# Patient Record
Sex: FEMALE | Race: BLACK OR AFRICAN AMERICAN | NOT HISPANIC OR LATINO
[De-identification: names, ages, dates, MRNs, and addresses within clinical notes are randomized per-mention and may not be internally consistent; named-entity substitution may affect disease eponyms.]

---

## 2020-03-28 ENCOUNTER — LABORATORY RESULT (OUTPATIENT)
Age: 50
End: 2020-03-28

## 2020-03-29 ENCOUNTER — APPOINTMENT (OUTPATIENT)
Dept: DISASTER EMERGENCY | Facility: CLINIC | Age: 50
End: 2020-03-29
Payer: COMMERCIAL

## 2020-03-29 VITALS
OXYGEN SATURATION: 98 % | TEMPERATURE: 98.6 F | DIASTOLIC BLOOD PRESSURE: 84 MMHG | HEART RATE: 74 BPM | SYSTOLIC BLOOD PRESSURE: 156 MMHG | RESPIRATION RATE: 14 BRPM

## 2020-03-29 DIAGNOSIS — Z20.828 CONTACT WITH AND (SUSPECTED) EXPOSURE TO OTHER VIRAL COMMUNICABLE DISEASES: ICD-10-CM

## 2020-03-29 DIAGNOSIS — R05 COUGH: ICD-10-CM

## 2020-03-29 DIAGNOSIS — R06.02 SHORTNESS OF BREATH: ICD-10-CM

## 2020-03-29 PROBLEM — Z00.00 ENCOUNTER FOR PREVENTIVE HEALTH EXAMINATION: Status: ACTIVE | Noted: 2020-03-29

## 2020-03-29 PROCEDURE — 99202 OFFICE O/P NEW SF 15 MIN: CPT

## 2020-06-10 PROBLEM — Z20.828 CLOSE EXPOSURE TO COVID-19 VIRUS: Status: ACTIVE | Noted: 2020-06-10

## 2020-06-10 PROBLEM — R05 COUGH: Status: ACTIVE | Noted: 2020-06-10

## 2020-06-10 PROBLEM — R06.02 SHORTNESS OF BREATH: Status: ACTIVE | Noted: 2020-06-10

## 2020-06-10 NOTE — HISTORY OF PRESENT ILLNESS
[FreeTextEntry8] : MISTI JEFFERY  is a 50 year old  F\par \par There are atypical symptoms of COVID: cough, SOB.  \par There are clinical high risk features: hypertension.  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  \par Limited physical exam was performed with exceptions as noted below.\par

## 2020-06-10 NOTE — PLAN
[FreeTextEntry1] : COVID 19 testing was performed.\par \par Advised test results may take days to return. \par Discussed diagnostic accuracy and possibility of false negative results\par Instructed to self quarantine.   \par \par Quarantine steps: Do not go to work, school or public areas. Avoid using public transportation, airports, taxis and ride sharing. As much as possible separate themselves from other people at home. Wear mask when ever they are around other people. Reschedule non-urgent medical appointments to another date. Wash hands with soap and water for at least 20 seconds or use an alcohol based  that contains 60-95% alcohol covering all surfaces until dry. Cover mouth and nose with tissue when they cough or sneeze, throw tissue in trash and wash hands. Avoid touching eyes, mouth and nose with hands. Avoid sharing personal items such as dishes, drinking glasses, cups, eating utensils, towels and bedding with other people. Clean and disinfect all high touch surfaces.\par All patients close contacts should also self quarantine.  \par As per CDC guidelines, may discontinue quarantine when the following 3 conditions occur. 1) Pt has no fever with out taking anti fever medications for 3 days.  2) Other symptoms have improved.  3) At least 7 days have passed since your first symptoms appeared.  \par Social distancing once quarantine is completed.\par \par Close contacts with comorbidities are at higher risk of COVID disease.  \par \par If high risk symptoms of chest discomfort, severe shortness of breath at rest, worsening shortness of breath with minimal exertion, new or worsening wheezing, dizziness, bluish tint to lips/face, confusion or inability to arouse,  then instructed to seek emergent evaluation.\par \par The above plan was reviewed with the patient.\par All questions have been answered.\par Instructed to call PMD or  [address] [place] [address] with further questions.\par \par The entirety of this note is confirmed by the signing provider below.\par \par Sincerely,\par \par \par Soy Lorenzo PA-C\par

## 2021-11-18 ENCOUNTER — NON-APPOINTMENT (OUTPATIENT)
Age: 51
End: 2021-11-18

## 2021-11-18 ENCOUNTER — APPOINTMENT (OUTPATIENT)
Dept: ORTHOPEDIC SURGERY | Facility: CLINIC | Age: 51
End: 2021-11-18
Payer: COMMERCIAL

## 2021-11-18 DIAGNOSIS — M67.40 GANGLION, UNSPECIFIED SITE: ICD-10-CM

## 2021-11-18 PROCEDURE — 20612 ASPIRATE/INJ GANGLION CYST: CPT | Mod: F3

## 2021-11-18 PROCEDURE — 99204 OFFICE O/P NEW MOD 45 MIN: CPT | Mod: 25

## 2023-03-19 ENCOUNTER — EMERGENCY (EMERGENCY)
Facility: HOSPITAL | Age: 53
LOS: 0 days | Discharge: HOME | End: 2023-03-19
Payer: COMMERCIAL

## 2023-03-19 VITALS
HEART RATE: 69 BPM | RESPIRATION RATE: 18 BRPM | WEIGHT: 190.04 LBS | SYSTOLIC BLOOD PRESSURE: 179 MMHG | TEMPERATURE: 98 F | OXYGEN SATURATION: 98 % | HEIGHT: 66 IN | DIASTOLIC BLOOD PRESSURE: 104 MMHG

## 2023-03-19 VITALS
DIASTOLIC BLOOD PRESSURE: 81 MMHG | OXYGEN SATURATION: 96 % | TEMPERATURE: 98 F | RESPIRATION RATE: 18 BRPM | SYSTOLIC BLOOD PRESSURE: 129 MMHG | HEART RATE: 69 BPM
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
## 2023-03-19 DIAGNOSIS — M54.2 CERVICALGIA: ICD-10-CM

## 2023-03-19 DIAGNOSIS — E11.9 TYPE 2 DIABETES MELLITUS WITHOUT COMPLICATIONS: ICD-10-CM

## 2023-03-19 DIAGNOSIS — Z79.84 LONG TERM (CURRENT) USE OF ORAL HYPOGLYCEMIC DRUGS: ICD-10-CM
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
## 2023-03-19 DIAGNOSIS — S16.1XXA STRAIN OF MUSCLE, FASCIA AND TENDON AT NECK LEVEL, INITIAL ENCOUNTER: ICD-10-CM

## 2023-03-19 DIAGNOSIS — I10 ESSENTIAL (PRIMARY) HYPERTENSION: ICD-10-CM

## 2023-03-19 DIAGNOSIS — E78.5 HYPERLIPIDEMIA, UNSPECIFIED: ICD-10-CM

## 2023-03-19 DIAGNOSIS — Z86.32 PERSONAL HISTORY OF GESTATIONAL DIABETES: ICD-10-CM

## 2023-03-19 DIAGNOSIS — Z86.2 PERSONAL HISTORY OF DISEASES OF THE BLOOD AND BLOOD-FORMING ORGANS AND CERTAIN DISORDERS INVOLVING THE IMMUNE MECHANISM: ICD-10-CM

## 2023-03-19 DIAGNOSIS — T50.996A UNDERDOSING OF OTHER DRUGS, MEDICAMENTS AND BIOLOGICAL SUBSTANCES, INITIAL ENCOUNTER: ICD-10-CM

## 2023-03-19 PROCEDURE — 99284 EMERGENCY DEPT VISIT MOD MDM: CPT

## 2023-03-19 RX ORDER — TAMSULOSIN HYDROCHLORIDE 0.4 MG/1
1 CAPSULE ORAL
Qty: 14 | Refills: 0
Start: 2023-03-19 | End: 2023-04-01

## 2023-03-19 RX ORDER — KETOROLAC TROMETHAMINE 30 MG/ML
1 SYRINGE (ML) INJECTION
Qty: 20 | Refills: 0
Start: 2023-03-19 | End: 2023-03-23

## 2023-03-19 RX ORDER — IBUPROFEN 200 MG
1 TABLET ORAL
Qty: 20 | Refills: 0
Start: 2023-03-19 | End: 2023-03-23

## 2023-03-19 RX ORDER — DIAZEPAM 5 MG
5 TABLET ORAL ONCE
Refills: 0 | Status: DISCONTINUED | OUTPATIENT
Start: 2023-03-19 | End: 2023-03-19

## 2023-03-19 RX ORDER — METHOCARBAMOL 500 MG/1
2 TABLET, FILM COATED ORAL
Qty: 30 | Refills: 0
Start: 2023-03-19 | End: 2023-03-23

## 2023-03-19 RX ORDER — IBUPROFEN 200 MG
600 TABLET ORAL ONCE
Refills: 0 | Status: COMPLETED | OUTPATIENT
Start: 2023-03-19 | End: 2023-03-19

## 2023-03-19 RX ORDER — DEXAMETHASONE 0.5 MG/5ML
10 ELIXIR ORAL ONCE
Refills: 0 | Status: COMPLETED | OUTPATIENT
Start: 2023-03-19 | End: 2023-03-19

## 2023-03-19 RX ADMIN — Medication 6 MILLIGRAM(S): at 17:16

## 2023-03-19 RX ADMIN — Medication 600 MILLIGRAM(S): at 17:27

## 2023-03-19 RX ADMIN — Medication 5 MILLIGRAM(S): at 17:16

## 2023-03-19 RX ADMIN — Medication 600 MILLIGRAM(S): at 17:16

## 2023-03-19 NOTE — ED PROVIDER NOTE - PATIENT PORTAL LINK FT
You can access the FollowMyHealth Patient Portal offered by [facility] by registering at the following website: [email or web address]. By joining [employer]’s FollowMyHealth portal, you will also be able to view your health information using other applications (apps) compatible with our system.

## 2023-03-19 NOTE — ED ADULT TRIAGE NOTE - CHIEF COMPLAINT QUOTE
Patient BIB EMS with complaints of pain to L side of head that radiates to l side of neck, pt denies any blurry vision, loss of balance and weakness.

## 2023-03-19 NOTE — ED PROVIDER NOTE - PROVIDER TOKENS
FREE:[LAST:[your pmd in 1-3 days],PHONE:[(   )    -],FAX:[(   )    -]],FREE:[LAST:[physical therapy in 1-3 days],PHONE:[(   )    -],FAX:[(   )    -]],PROVIDER:[TOKEN:[[id number]],FOLLOWUP:[1-3 Days]]

## 2023-03-19 NOTE — ED ADULT NURSE NOTE - OBJECTIVE STATEMENT
as per pt, "I slept wrong on my neck and I cannot move my neck the pain was intermittent and at [facility] the pain became so bad I couldn't turn my head or drive" non labored breathing on room air, pt's family at bedside, pt in no acute distress

## 2023-03-19 NOTE — ED PROVIDER NOTE - NS ED ROS FT
Review of Systems    Constitutional: (-) fever   Eyes/ENT: (-) vision changes  Cardiovascular: (-) chest pain, (-) syncope (-) palpitations  Respiratory: (-) cough, (-) shortness of breath  Gastrointestinal: (-) vomiting, (-) diarrhea (-) abdominal pain  Genitourinary:  (-) dysuria   Musculoskeletal: (+) neck pain, (-) back pain, (-) leg pain/swelling  Integumentary: (-) rash, (-) edema  Neurological: (-) headache, (-) confusion  Hematologic: (-) easy bruising

## 2023-03-19 NOTE — ED ADULT NURSE NOTE - NSICDXPASTMEDICALHX_GEN_ALL_CORE_FT
PAST MEDICAL HISTORY:  Anemia     Gestational diabetes     HTN (hypertension)     Hyperlipidemia

## 2023-03-19 NOTE — ED PROVIDER NOTE - OBJECTIVE STATEMENT
53 y/o F with PMH HTN, HLD, DM on metformin presents with moderate constant throbbing L neck pain radiating to L head s/p sleeping awkwardly on 3 pillows last night,   +much worse with moving head, much better with holding in certain positions.   did not take her HTN meds today.   denies anesthesia/paresthesia, focal weakness, direct trauma, hx IVDA, recent injections, weakness, hx neck surgeries, unexplained wt loss, fever, vomiting, rash.

## 2023-03-19 NOTE — ED ADULT NURSE NOTE - SUICIDE SCREENING QUESTION 3
No Spoke to patient regarding scheduling of appt for right shoulder follow up. Was able to get her on the schedule for 3/29 at 2:45. Patient was looking for appointment around 3, but will call back if she needs to reschedule/adjust appt time.

## 2023-03-19 NOTE — ED ADULT NURSE REASSESSMENT NOTE - NS ED NURSE REASSESS COMMENT FT1
pt states pain is a little less but still feels the stiffness, pt's family at bedside, pt with non labored breathing on room air, safety precautions are in places, nursing care continues

## 2023-03-19 NOTE — ED PROVIDER NOTE - PHYSICAL EXAMINATION
PHYSICAL EXAM:    GENERAL: Alert, appears stated age, well appearing, non-toxic  SKIN: Warm, pink and dry. MMM.   HEAD: NC, AT  EYE: Normal lids/conjunctiva, PERRL, EOMI  ENT: Normal hearing, patent oropharynx   NECK: +supple. No meningismus, or JVD, +Trachea midline. no midline tenderness/step offs. +L trap muscle spasm. +L paracervical ttp.   Pulm: Bilateral BS, normal resp effort, no wheezes, stridor, or retractions  CV: RRR, no M/R/G, 2+and = radial pulses  Abd: soft, non-tender, non-distended.   Mskel: no erythema, cyanosis, edema. no calf tenderness.   Neuro: AAOx3, no sensory/motor deficits; no speech slurring, pronator drift, facial asymmetry. 5/5 strength throughout.

## 2023-03-19 NOTE — ED PROVIDER NOTE - CLINICAL SUMMARY MEDICAL DECISION MAKING FREE TEXT BOX
+paracervical ttp. no trauma/paresthesia/weakness.  Reviewed all results and necessity for follow up. Counseled on red flags and to return for them.  Patient appears well on discharge.